# Patient Record
Sex: MALE | Race: WHITE | NOT HISPANIC OR LATINO | Employment: UNEMPLOYED | URBAN - NONMETROPOLITAN AREA
[De-identification: names, ages, dates, MRNs, and addresses within clinical notes are randomized per-mention and may not be internally consistent; named-entity substitution may affect disease eponyms.]

---

## 2017-03-15 ENCOUNTER — HOSPITAL ENCOUNTER (EMERGENCY)
Facility: HOSPITAL | Age: 27
Discharge: ANOTHER HOSPITAL | End: 2017-03-15
Attending: EMERGENCY MEDICINE | Admitting: EMERGENCY MEDICINE

## 2017-03-15 VITALS
BODY MASS INDEX: 23.62 KG/M2 | SYSTOLIC BLOOD PRESSURE: 116 MMHG | TEMPERATURE: 97.6 F | HEART RATE: 89 BPM | DIASTOLIC BLOOD PRESSURE: 60 MMHG | HEIGHT: 70 IN | RESPIRATION RATE: 18 BRPM | WEIGHT: 165 LBS | OXYGEN SATURATION: 99 %

## 2017-03-15 DIAGNOSIS — F69 MENTAL AND BEHAVIORAL PROBLEM: ICD-10-CM

## 2017-03-15 DIAGNOSIS — F48.9 MENTAL AND BEHAVIORAL PROBLEM: ICD-10-CM

## 2017-03-15 DIAGNOSIS — R45.851 SUICIDAL IDEATION: Primary | ICD-10-CM

## 2017-03-15 LAB
ALBUMIN SERPL-MCNC: 5.1 G/DL (ref 3.4–4.8)
ALBUMIN/GLOB SERPL: 1.4 G/DL (ref 1.1–1.8)
ALP SERPL-CCNC: 126 U/L (ref 38–126)
ALT SERPL W P-5'-P-CCNC: 28 U/L (ref 21–72)
AMPHET+METHAMPHET UR QL: NEGATIVE
ANION GAP SERPL CALCULATED.3IONS-SCNC: 20 MMOL/L (ref 5–15)
APAP SERPL-MCNC: <10 MCG/ML (ref 10–30)
AST SERPL-CCNC: 28 U/L (ref 17–59)
BARBITURATES UR QL SCN: NEGATIVE
BASOPHILS # BLD AUTO: 0.01 10*3/MM3 (ref 0–0.2)
BASOPHILS NFR BLD AUTO: 0.1 % (ref 0–2)
BENZODIAZ UR QL SCN: NEGATIVE
BILIRUB SERPL-MCNC: 0.5 MG/DL (ref 0.2–1.3)
BUN BLD-MCNC: 14 MG/DL (ref 7–21)
BUN/CREAT SERPL: 22.2 (ref 7–25)
CALCIUM SPEC-SCNC: 9.7 MG/DL (ref 8.4–10.2)
CANNABINOIDS SERPL QL: NEGATIVE
CHLORIDE SERPL-SCNC: 99 MMOL/L (ref 95–110)
CO2 SERPL-SCNC: 23 MMOL/L (ref 22–31)
COCAINE UR QL: NEGATIVE
CREAT BLD-MCNC: 0.63 MG/DL (ref 0.7–1.3)
DEPRECATED RDW RBC AUTO: 41.4 FL (ref 35.1–43.9)
EOSINOPHIL # BLD AUTO: 0.08 10*3/MM3 (ref 0–0.7)
EOSINOPHIL NFR BLD AUTO: 0.9 % (ref 0–7)
ERYTHROCYTE [DISTWIDTH] IN BLOOD BY AUTOMATED COUNT: 13.8 % (ref 11.5–14.5)
ETHANOL BLD-MCNC: <10 MG/DL (ref 0–10)
ETHANOL UR QL: <0.01 %
GFR SERPL CREATININE-BSD FRML MDRD: 153 ML/MIN/1.73 (ref 77–179)
GLOBULIN UR ELPH-MCNC: 3.7 GM/DL (ref 2.3–3.5)
GLUCOSE BLD-MCNC: 132 MG/DL (ref 60–100)
GLUCOSE BLDC GLUCOMTR-MCNC: 134 MG/DL (ref 70–130)
HCT VFR BLD AUTO: 37 % (ref 39–49)
HGB BLD-MCNC: 12.9 G/DL (ref 13.7–17.3)
HOLD SPECIMEN: NORMAL
HOLD SPECIMEN: NORMAL
IMM GRANULOCYTES # BLD: 0.02 10*3/MM3 (ref 0–0.02)
IMM GRANULOCYTES NFR BLD: 0.2 % (ref 0–0.5)
LYMPHOCYTES # BLD AUTO: 1.96 10*3/MM3 (ref 0.6–4.2)
LYMPHOCYTES NFR BLD AUTO: 22.8 % (ref 10–50)
MCH RBC QN AUTO: 28.4 PG (ref 26.5–34)
MCHC RBC AUTO-ENTMCNC: 34.9 G/DL (ref 31.5–36.3)
MCV RBC AUTO: 81.5 FL (ref 80–98)
METHADONE UR QL SCN: NEGATIVE
MONOCYTES # BLD AUTO: 1.01 10*3/MM3 (ref 0–0.9)
MONOCYTES NFR BLD AUTO: 11.8 % (ref 0–12)
NEUTROPHILS # BLD AUTO: 5.51 10*3/MM3 (ref 2–8.6)
NEUTROPHILS NFR BLD AUTO: 64.2 % (ref 37–80)
OPIATES UR QL: NEGATIVE
OXYCODONE UR QL SCN: NEGATIVE
PLATELET # BLD AUTO: 186 10*3/MM3 (ref 150–450)
PMV BLD AUTO: 10.5 FL (ref 8–12)
POTASSIUM BLD-SCNC: 3.1 MMOL/L (ref 3.5–5.1)
PROT SERPL-MCNC: 8.8 G/DL (ref 6.3–8.6)
RBC # BLD AUTO: 4.54 10*6/MM3 (ref 4.37–5.74)
SALICYLATES SERPL-MCNC: <1 MG/DL (ref 10–20)
SODIUM BLD-SCNC: 142 MMOL/L (ref 137–145)
TSH SERPL DL<=0.05 MIU/L-ACNC: 3.48 MIU/ML (ref 0.46–4.68)
WBC NRBC COR # BLD: 8.59 10*3/MM3 (ref 3.2–9.8)
WHOLE BLOOD HOLD SPECIMEN: NORMAL
WHOLE BLOOD HOLD SPECIMEN: NORMAL

## 2017-03-15 PROCEDURE — 80307 DRUG TEST PRSMV CHEM ANLYZR: CPT | Performed by: EMERGENCY MEDICINE

## 2017-03-15 PROCEDURE — 96372 THER/PROPH/DIAG INJ SC/IM: CPT

## 2017-03-15 PROCEDURE — 85025 COMPLETE CBC W/AUTO DIFF WBC: CPT | Performed by: EMERGENCY MEDICINE

## 2017-03-15 PROCEDURE — 25010000002 DIPHENHYDRAMINE PER 50 MG: Performed by: EMERGENCY MEDICINE

## 2017-03-15 PROCEDURE — 99285 EMERGENCY DEPT VISIT HI MDM: CPT

## 2017-03-15 PROCEDURE — 84443 ASSAY THYROID STIM HORMONE: CPT | Performed by: EMERGENCY MEDICINE

## 2017-03-15 PROCEDURE — 80053 COMPREHEN METABOLIC PANEL: CPT | Performed by: EMERGENCY MEDICINE

## 2017-03-15 PROCEDURE — 82962 GLUCOSE BLOOD TEST: CPT

## 2017-03-15 RX ORDER — POTASSIUM CHLORIDE 20MEQ/15ML
40 LIQUID (ML) ORAL ONCE
Status: COMPLETED | OUTPATIENT
Start: 2017-03-15 | End: 2017-03-15

## 2017-03-15 RX ORDER — SODIUM CHLORIDE 0.9 % (FLUSH) 0.9 %
10 SYRINGE (ML) INJECTION AS NEEDED
Status: DISCONTINUED | OUTPATIENT
Start: 2017-03-15 | End: 2017-03-15

## 2017-03-15 RX ORDER — DIPHENHYDRAMINE HYDROCHLORIDE 50 MG/ML
50 INJECTION INTRAMUSCULAR; INTRAVENOUS ONCE
Status: COMPLETED | OUTPATIENT
Start: 2017-03-15 | End: 2017-03-15

## 2017-03-15 RX ORDER — ATORVASTATIN CALCIUM 10 MG/1
10 TABLET, FILM COATED ORAL DAILY
COMMUNITY

## 2017-03-15 RX ORDER — RAMELTEON 8 MG/1
8 TABLET ORAL NIGHTLY
COMMUNITY

## 2017-03-15 RX ORDER — IBUPROFEN 600 MG/1
600 TABLET ORAL EVERY 6 HOURS PRN
COMMUNITY

## 2017-03-15 RX ORDER — CARBAMAZEPINE 100 MG/1
TABLET, CHEWABLE ORAL 3 TIMES DAILY
COMMUNITY

## 2017-03-15 RX ORDER — GABAPENTIN 800 MG/1
800 TABLET ORAL 3 TIMES DAILY
COMMUNITY

## 2017-03-15 RX ORDER — DULOXETIN HYDROCHLORIDE 60 MG/1
60 CAPSULE, DELAYED RELEASE ORAL DAILY
COMMUNITY

## 2017-03-15 RX ORDER — OLANZAPINE 5 MG/1
5 TABLET ORAL NIGHTLY
COMMUNITY

## 2017-03-15 RX ORDER — PRAZOSIN HYDROCHLORIDE 2 MG/1
2 CAPSULE ORAL NIGHTLY
COMMUNITY

## 2017-03-15 RX ADMIN — POTASSIUM CHLORIDE 40 MEQ: 20 SOLUTION ORAL at 06:23

## 2017-03-15 RX ADMIN — DIPHENHYDRAMINE HYDROCHLORIDE 50 MG: 50 INJECTION INTRAMUSCULAR; INTRAVENOUS at 04:31

## 2017-03-15 NOTE — ED PROVIDER NOTES
Subjective   HPI Comments: Patient states that He is supposed to be admitted at [facility] and he wanted us to transfer him over there.  He was just released from care home.  He have all fracture on his lower extremity left ankle and he is supposed to be on Percocet and he wanted to get Percocet for pain and he wanted me to give him an Ativan shot.      History provided by:  Patient      Review of Systems   Constitutional: Negative for activity change, appetite change, fatigue and fever.   HENT: Negative for congestion, facial swelling, mouth sores, nosebleeds, sore throat and trouble swallowing.    Eyes: Negative for discharge, redness and itching.   Respiratory: Negative for apnea, cough and wheezing.    Cardiovascular: Negative for chest pain and palpitations.   Gastrointestinal: Negative for blood in stool and nausea.   Endocrine: Negative for cold intolerance, heat intolerance, polydipsia, polyphagia and polyuria.   Genitourinary: Negative for difficulty urinating, dysuria, flank pain, frequency and hematuria.   Musculoskeletal: Negative for gait problem, joint swelling and neck pain.   Skin: Negative.  Negative for color change, pallor and rash.   Allergic/Immunologic: Negative for environmental allergies.   Neurological: Negative for dizziness, seizures, syncope, speech difficulty, light-headedness, numbness and headaches.   Hematological: Negative for adenopathy.   Psychiatric/Behavioral: Negative for agitation, behavioral problems, confusion and sleep disturbance. The patient is not nervous/anxious.        History reviewed. No pertinent past medical history.    Allergies   Allergen Reactions   • Geodon [Ziprasidone]      Rash   • Haldol [Haloperidol Lactate]      rash   • Lithium      Hypothyroidism per pt.       History reviewed. No pertinent past surgical history.    History reviewed. No pertinent family history.    Social History     Social History   • Marital status: Single     Spouse name: N/A   • Number of  children: N/A   • Years of education: N/A     Social History Main Topics   • Smoking status: Never Smoker   • Smokeless tobacco: Current User     Types: Snuff   • Alcohol use Yes      Comment: socially   • Drug use: Yes     Special: Marijuana      Comment: january was last use   • Sexual activity: Not Asked     Other Topics Concern   • None     Social History Narrative   • None           Objective   Physical Exam   Constitutional: He is oriented to person, place, and time. He appears well-developed and well-nourished.   HENT:   Head: Normocephalic and atraumatic.   Nose: Nose normal.   Mouth/Throat: Oropharynx is clear and moist.   Eyes: Conjunctivae and EOM are normal. Pupils are equal, round, and reactive to light.   Neck: Normal range of motion. Neck supple.   Cardiovascular: Normal rate, regular rhythm, normal heart sounds and intact distal pulses.    Pulmonary/Chest: Effort normal and breath sounds normal.   Abdominal: Soft. Bowel sounds are normal.   Musculoskeletal: Normal range of motion.   Neurological: He is alert and oriented to person, place, and time.   Skin: Skin is warm and dry. Rash noted.   Multiple rash is all over his body   Psychiatric: He has a normal mood and affect. His behavior is normal. Judgment and thought content normal.   Nursing note and vitals reviewed.      Procedures         ED Course  ED Course   Comment By Time   Seen by mental health and will call [facility] Miles Montes De Oca MD 03/15 0507   Going to see patient for his and the phone Miles Montes De Oca MD 03/15 0507   After mental health evaluation it was determined that he should be transferred to [facility] by [facility]. Miles Montes De Oca MD 03/15 3859   Paper signed to the  Miles Montes De Oca MD 03/15 2738   Discussed with Dr. Munoz was her state who agreed to accept the patient transfer Miles Montes De Oca MD 03/15 5123   Patient sign an over Dr. Mejia pending transportation to [facility] Miles Montes De Oca MD 03/15 2175   Patient  just got transferred Miles Montes De Oca MD 03/15 0705                  [facility]    Final diagnoses:   Suicidal ideation   Mental and behavioral problem            Miles Montes De Oca MD  03/15/17 0766

## 2017-03-15 NOTE — ED NOTES
"Pt verbally aggressive towards staff stating \"you bitch, you must be PMSing\". Security and PD at bedside.      Marcy Erwin RN  03/15/17 0538    "

## 2017-03-15 NOTE — ED NOTES
Pt presents to the ED via EMS after being released from retirement at 2200.  Pt. States he is suppose to be checking into [facility] in [facility] but does not have a ride.  Pt. States he is suppose to check in there after agreeing to with [facility].  Pt. Disarmed a  and was going to shoot himself.  Pt. Also states he tied a rope around his neck in attempt to strangle himself as well.  Pt. States if he does not go to [facility] then he will have to go back to retirement.  Suicidal precautions in place at this time and sitter at bedside.  Resps even and unlabored.      Corinna Barahona RN  03/15/17 6828       Corinna Barahona RN  03/15/17 9050

## 2017-03-15 NOTE — ED NOTES
Report given to Kari at [facility] who states no additional questions at this time. Resps even and unlabored.  VSS.  NAD.  Pt. Calm and cooperative at this time.  No other needs voiced.       Corinna Barahona RN  03/15/17 0663

## 2017-03-15 NOTE — ED NOTES
"Pt. Requesting pain medications again at this time stating \"just give me my meds so I can take something to help me sleep\".  Pt. Informed that this is not allowed.  Dr. Montes De Oca aware of pts. Repeated requests for medications and gives no new orders at this time.  Pt. Remains on suicidal precautions with sitter at bedside for uninterrupted continuous observation.  VSS.  NAD.  WIll continue to monitor.      Corinna Barahona RN  03/15/17 0627    "

## 2017-03-15 NOTE — ED NOTES
SPoke with behavioral health who states they will be down when they can to assess pt.       Corinna Barahona RN  03/15/17 7349

## 2017-03-15 NOTE — ED NOTES
"Pt. Remains on continuous uninterrupted observation per ED Tech as pt. Bedside sitter due to suicidal precautions.  Resps even and unlabored. Pt. Requests pain medication and \"something to help him sleep\".  Dr. Montes De Oca aware and states benadryl is all he will give pt.  Pt. Made aware of this and wishes to speak with Provider.  Dr. Montes De Oca made aware of this requests and states he will speak with pt.  VSS.  NAD.  WIll continue to monitor.       Corinna Barahona RN  03/15/17 0555    "

## 2017-03-15 NOTE — ED NOTES
Pt. To conference room for psych evaluation per [facility].  Security and ED tech accompanied pt. Due to suicidal precautions     Corinna Barahona RN  03/15/17 0439